# Patient Record
Sex: FEMALE | Employment: OTHER | URBAN - METROPOLITAN AREA
[De-identification: names, ages, dates, MRNs, and addresses within clinical notes are randomized per-mention and may not be internally consistent; named-entity substitution may affect disease eponyms.]

---

## 2021-07-19 ENCOUNTER — TRANSFERRED RECORDS (OUTPATIENT)
Dept: HEALTH INFORMATION MANAGEMENT | Facility: CLINIC | Age: 61
End: 2021-07-19

## 2021-07-29 ENCOUNTER — TELEPHONE (OUTPATIENT)
Dept: DERMATOLOGY | Facility: CLINIC | Age: 61
End: 2021-07-29

## 2021-07-29 NOTE — TELEPHONE ENCOUNTER
[facility] Call Center    Phone Message    May a detailed message be left on voicemail: yes     Reason for Call: Pt has a rash on legs and will not go away. Pt has to have a surgery and will not be able to have surgery until rash is cleared up. I scheduled Pt and wait listed for 09/07. Please call Pt to discuss. Thank you    Action Taken: Message routed to:  [facility] ([facility]): Derm    Travel Screening: Not Applicable

## 2021-07-30 NOTE — TELEPHONE ENCOUNTER
Spoke to patient and advised she could see her PCP and if PCP or Urgent care which she reports she has done.     At this time, our wait list is 125 patients long, so not sure what we can do as patient has not been seen here to establish care.     Patient verbalized understanding. Alis Mcgee RN

## 2021-09-07 ENCOUNTER — OFFICE VISIT (OUTPATIENT)
Dept: DERMATOLOGY | Facility: CLINIC | Age: 61
End: 2021-09-07
Payer: COMMERCIAL

## 2021-09-07 VITALS — HEART RATE: 95 BPM | OXYGEN SATURATION: 98 % | SYSTOLIC BLOOD PRESSURE: 136 MMHG | DIASTOLIC BLOOD PRESSURE: 81 MMHG

## 2021-09-07 DIAGNOSIS — L20.9 ATOPIC DERMATITIS, UNSPECIFIED TYPE: Primary | ICD-10-CM

## 2021-09-07 PROCEDURE — 99203 OFFICE O/P NEW LOW 30 MIN: CPT | Performed by: PHYSICIAN ASSISTANT

## 2021-09-07 RX ORDER — TRIAMCINOLONE ACETONIDE 0.25 MG/G
CREAM TOPICAL
COMMUNITY
Start: 2021-05-27

## 2021-09-07 RX ORDER — FERROUS SULFATE 325(65) MG
325 TABLET ORAL
COMMUNITY
Start: 2021-08-23

## 2021-09-07 RX ORDER — GLIPIZIDE 10 MG/1
10 TABLET, FILM COATED, EXTENDED RELEASE ORAL
COMMUNITY

## 2021-09-07 RX ORDER — TRAVOPROST OPHTHALMIC SOLUTION 0.04 MG/ML
1 SOLUTION OPHTHALMIC
COMMUNITY
Start: 2021-02-23

## 2021-09-07 RX ORDER — LEVOCETIRIZINE DIHYDROCHLORIDE 5 MG/1
5 TABLET, FILM COATED ORAL EVERY EVENING
Qty: 30 TABLET | Refills: 8 | Status: SHIPPED | OUTPATIENT
Start: 2021-09-07

## 2021-09-07 RX ORDER — VENLAFAXINE HYDROCHLORIDE 150 MG/1
150 CAPSULE, EXTENDED RELEASE ORAL
COMMUNITY
Start: 2021-07-12

## 2021-09-07 RX ORDER — ERGOCALCIFEROL 1.25 MG/1
50000 CAPSULE, LIQUID FILLED ORAL WEEKLY
COMMUNITY

## 2021-09-07 RX ORDER — CARBOXYMETHYLCELLULOSE SODIUM 5 MG/ML
SOLUTION/ DROPS OPHTHALMIC
COMMUNITY
Start: 2021-03-17

## 2021-09-07 RX ORDER — CHLORAL HYDRATE 500 MG
2 CAPSULE ORAL DAILY
COMMUNITY

## 2021-09-07 RX ORDER — SIMVASTATIN 40 MG
40 TABLET ORAL
COMMUNITY

## 2021-09-07 RX ORDER — IBUPROFEN 600 MG/1
600 TABLET, FILM COATED ORAL
COMMUNITY

## 2021-09-07 RX ORDER — ASPIRIN 81 MG
100 TABLET, DELAYED RELEASE (ENTERIC COATED) ORAL DAILY
COMMUNITY

## 2021-09-07 RX ORDER — TELMISARTAN 40 MG/1
40 TABLET ORAL
COMMUNITY

## 2021-09-07 RX ORDER — HYDROCHLOROTHIAZIDE 25 MG/1
25 TABLET ORAL
COMMUNITY

## 2021-09-07 RX ORDER — VENLAFAXINE HYDROCHLORIDE 75 MG/1
75 CAPSULE, EXTENDED RELEASE ORAL
COMMUNITY
Start: 2021-07-12

## 2021-09-07 RX ORDER — TRIAMCINOLONE ACETONIDE 1 MG/G
CREAM TOPICAL
Qty: 453.6 G | Refills: 2 | Status: SHIPPED | OUTPATIENT
Start: 2021-09-07

## 2021-09-07 NOTE — LETTER
9/7/2021         RE: Heather Dunham  [address]  [place] [address]        Dear Colleague,    Thank you for referring your patient, Heather Dunham, to the [facility]. Please see a copy of my visit note below.    Heather Dunham is an extremely pleasant 61 year old year old female patient here today for rash on legs and chest.   Patient states this has been present for 6 months, intermittent.  Patient reports the following symptoms:  Very itch. Patient notes that she uses suave moisturizer. She has tried a topical steroid, but only given small tubes. Patient has no other skin complaints today.  Remainder of the HPI, Meds, PMH, Allergies, FH, and SH was reviewed in chart.    Past Medical History:   Diagnosis Date     Basal cell carcinoma        History reviewed. No pertinent surgical history.     History reviewed. No pertinent family history.    Social History     Socioeconomic History     Marital status:      Spouse name: Not on file     Number of children: Not on file     Years of education: Not on file     Highest education level: Not on file   Occupational History     Not on file   Tobacco Use     Smoking status: Current Every Day Smoker     Types: Cigarettes     Smokeless tobacco: Never Used   Substance and Sexual Activity     Alcohol use: Not on file     Drug use: Not on file     Sexual activity: Not on file   Other Topics Concern     Not on file   Social History Narrative     Not on file     Social Determinants of Health     Financial Resource Strain:      Difficulty of Paying Living Expenses:    Food Insecurity:      Worried About Running Out of Food in the Last Year:      Ran Out of Food in the Last Year:    Transportation Needs:      Lack of Transportation (Medical):      Lack of Transportation (Non-Medical):    Physical Activity:      Days of Exercise per Week:      Minutes of Exercise per Session:    Stress:      Feeling of Stress :    Social Connections:       Frequency of Communication with Friends and Family:      Frequency of Social Gatherings with Friends and Family:      Attends Denominational Services:      Active Member of Clubs or Organizations:      Attends Club or Organization Meetings:      Marital Status:    Intimate Partner Violence:      Fear of Current or Ex-Partner:      Emotionally Abused:      Physically Abused:      Sexually Abused:        Outpatient Encounter Medications as of 9/7/2021   Medication Sig Dispense Refill     carboxymethylcellulose (REFRESH PLUS) 0.5 % SOLN ophthalmic solution INSTILL 1 DROP INTO BOTH EYES AS NEEDED FOR DRY AND ITCHY EYES       diclofenac (VOLTAREN) 1 % topical gel Apply topically 4 times daily       docusate sodium (COLACE) 100 MG tablet Take 100 mg by mouth daily       ferrous sulfate (FEROSUL) 325 (65 Fe) MG tablet Take 325 mg by mouth       fish oil-omega-3 fatty acids 1000 MG capsule Take 2 g by mouth daily       glipiZIDE (GLUCOTROL XL) 10 MG 24 hr tablet Take 10 mg by mouth       hydrochlorothiazide (HYDRODIURIL) 25 MG tablet Take 25 mg by mouth       ibuprofen (ADVIL/MOTRIN) 600 MG tablet Take 600 mg by mouth       levocetirizine (XYZAL) 5 MG tablet Take 1 tablet (5 mg) by mouth every evening 30 tablet 8     magnesium oxide 200 MG TABS        metFORMIN (GLUCOPHAGE) 1000 MG tablet Take 1,000 mg by mouth       omeprazole (PRILOSEC) 20 MG DR capsule Take 20 mg by mouth       simvastatin (ZOCOR) 40 MG tablet Take 40 mg by mouth       telmisartan (MICARDIS) 40 MG tablet Take 40 mg by mouth       travoprost BAK FREE (TRAVATAN Z) 0.004 % ophthalmic solution 1 drop       triamcinolone (KENALOG) 0.025 % cream        triamcinolone (KENALOG) 0.1 % external cream Apply twice daily to affected area on chest and legs as needed for 3-4 weeks. 453.6 g 2     venlafaxine (EFFEXOR-XR) 150 MG 24 hr capsule Take 150 mg by mouth       venlafaxine (EFFEXOR-XR) 75 MG 24 hr capsule Take 75 mg by mouth       vitamin D2 (ERGOCALCIFEROL) 16359  units (1250 mcg) capsule Take 50,000 Units by mouth once a week       No facility-administered encounter medications on file as of 9/7/2021.             O:   NAD, WDWN, Alert & Oriented, Mood & Affect wnl, Vitals stable   Here today alone   /81   Pulse 95   SpO2 98%    General appearance normal   Vitals stable   Alert, oriented and in no acute distress   Eczematous plaques on chest and legs     Eyes: Conjunctivae/lids:Normal     ENT: Lips: normal    MSK:Normal    Pulm: Breathing Normal     Neuro/Psych: Orientation:Alert and Orientedx3 ; Mood/Affect:normal   A/P:  1. Most consistent with atopic dermatitis   Take xyzal at bedtime.   You will also be given a steroid ointment:  Apply triamcinolone twice daily as needed. Do not use on face.    For showering use Cetaphil, Dove, or Vanicream soap. Apply bland mositurizers such as  Cetaphil, Cerave, Vanicream, Eucerin cream at twice daily.     Recheck as needed.       Again, thank you for allowing me to participate in the care of your patient.        Sincerely,        Suma Tineo PA-C

## 2021-09-07 NOTE — PATIENT INSTRUCTIONS
Favor Eczema  Take xyzal at bedtime.   You will also be given a steroid ointment:  Apply triamcinolone twice daily as needed. Do not use on face.    For showering use Cetaphil, Dove, or Vanicream soap. Apply bland mositurizers such as  Cetaphil, Cerave, Vanicream, Eucerin cream at twice daily.       Consider dermablend to cover dark circles under eyes.

## 2021-09-07 NOTE — NURSING NOTE
Chief Complaint   Patient presents with     Rash       Vitals:    09/07/21 1131   BP: 136/81   Pulse: 95   SpO2: 98%     Wt Readings from Last 1 Encounters:   No data found for Wt       Sheeba Medellin LPN.................9/7/2021

## 2021-09-07 NOTE — PROGRESS NOTES
Heather Dunham is an extremely pleasant 61 year old year old female patient here today for rash on legs and chest.   Patient states this has been present for 6 months, intermittent.  Patient reports the following symptoms:  Very itch. Patient notes that she uses suave moisturizer. She has tried a topical steroid, but only given small tubes. Patient has no other skin complaints today.  Remainder of the HPI, Meds, PMH, Allergies, FH, and SH was reviewed in chart.    Past Medical History:   Diagnosis Date     Basal cell carcinoma        History reviewed. No pertinent surgical history.     History reviewed. No pertinent family history.    Social History     Socioeconomic History     Marital status:      Spouse name: Not on file     Number of children: Not on file     Years of education: Not on file     Highest education level: Not on file   Occupational History     Not on file   Tobacco Use     Smoking status: Current Every Day Smoker     Types: Cigarettes     Smokeless tobacco: Never Used   Substance and Sexual Activity     Alcohol use: Not on file     Drug use: Not on file     Sexual activity: Not on file   Other Topics Concern     Not on file   Social History Narrative     Not on file     Social Determinants of Health     Financial Resource Strain:      Difficulty of Paying Living Expenses:    Food Insecurity:      Worried About Running Out of Food in the Last Year:      Ran Out of Food in the Last Year:    Transportation Needs:      Lack of Transportation (Medical):      Lack of Transportation (Non-Medical):    Physical Activity:      Days of Exercise per Week:      Minutes of Exercise per Session:    Stress:      Feeling of Stress :    Social Connections:      Frequency of Communication with Friends and Family:      Frequency of Social Gatherings with Friends and Family:      Attends Yazidism Services:      Active Member of Clubs or Organizations:      Attends Club or Organization Meetings:      Marital  Status:    Intimate Partner Violence:      Fear of Current or Ex-Partner:      Emotionally Abused:      Physically Abused:      Sexually Abused:        Outpatient Encounter Medications as of 9/7/2021   Medication Sig Dispense Refill     carboxymethylcellulose (REFRESH PLUS) 0.5 % SOLN ophthalmic solution INSTILL 1 DROP INTO BOTH EYES AS NEEDED FOR DRY AND ITCHY EYES       diclofenac (VOLTAREN) 1 % topical gel Apply topically 4 times daily       docusate sodium (COLACE) 100 MG tablet Take 100 mg by mouth daily       ferrous sulfate (FEROSUL) 325 (65 Fe) MG tablet Take 325 mg by mouth       fish oil-omega-3 fatty acids 1000 MG capsule Take 2 g by mouth daily       glipiZIDE (GLUCOTROL XL) 10 MG 24 hr tablet Take 10 mg by mouth       hydrochlorothiazide (HYDRODIURIL) 25 MG tablet Take 25 mg by mouth       ibuprofen (ADVIL/MOTRIN) 600 MG tablet Take 600 mg by mouth       levocetirizine (XYZAL) 5 MG tablet Take 1 tablet (5 mg) by mouth every evening 30 tablet 8     magnesium oxide 200 MG TABS        metFORMIN (GLUCOPHAGE) 1000 MG tablet Take 1,000 mg by mouth       omeprazole (PRILOSEC) 20 MG DR capsule Take 20 mg by mouth       simvastatin (ZOCOR) 40 MG tablet Take 40 mg by mouth       telmisartan (MICARDIS) 40 MG tablet Take 40 mg by mouth       travoprost BAK FREE (TRAVATAN Z) 0.004 % ophthalmic solution 1 drop       triamcinolone (KENALOG) 0.025 % cream        triamcinolone (KENALOG) 0.1 % external cream Apply twice daily to affected area on chest and legs as needed for 3-4 weeks. 453.6 g 2     venlafaxine (EFFEXOR-XR) 150 MG 24 hr capsule Take 150 mg by mouth       venlafaxine (EFFEXOR-XR) 75 MG 24 hr capsule Take 75 mg by mouth       vitamin D2 (ERGOCALCIFEROL) 85121 units (1250 mcg) capsule Take 50,000 Units by mouth once a week       No facility-administered encounter medications on file as of 9/7/2021.             O:   NAD, WDWN, Alert & Oriented, Mood & Affect wnl, Vitals stable   Here today alone   /81    Pulse 95   SpO2 98%    General appearance normal   Vitals stable   Alert, oriented and in no acute distress   Eczematous plaques on chest and legs     Eyes: Conjunctivae/lids:Normal     ENT: Lips: normal    MSK:Normal    Pulm: Breathing Normal     Neuro/Psych: Orientation:Alert and Orientedx3 ; Mood/Affect:normal   A/P:  1. Most consistent with atopic dermatitis   Take xyzal at bedtime.   You will also be given a steroid ointment:  Apply triamcinolone twice daily as needed. Do not use on face.    For showering use Cetaphil, Dove, or Vanicream soap. Apply bland mositurizers such as  Cetaphil, Cerave, Vanicream, Eucerin cream at twice daily.     Recheck as needed.